# Patient Record
Sex: FEMALE | Race: BLACK OR AFRICAN AMERICAN | NOT HISPANIC OR LATINO
[De-identification: names, ages, dates, MRNs, and addresses within clinical notes are randomized per-mention and may not be internally consistent; named-entity substitution may affect disease eponyms.]

---

## 2017-04-27 ENCOUNTER — APPOINTMENT (OUTPATIENT)
Dept: PSYCHIATRY | Facility: CLINIC | Age: 52
End: 2017-04-27

## 2017-04-27 ENCOUNTER — OUTPATIENT (OUTPATIENT)
Dept: OUTPATIENT SERVICES | Facility: HOSPITAL | Age: 52
LOS: 1 days | Discharge: HOME | End: 2017-04-27

## 2017-04-27 RX ORDER — ALPRAZOLAM 0.25 MG
1 TABLET ORAL
Qty: 20 | Refills: 0 | OUTPATIENT
Start: 2017-04-27

## 2017-06-15 ENCOUNTER — APPOINTMENT (OUTPATIENT)
Dept: PSYCHIATRY | Facility: CLINIC | Age: 52
End: 2017-06-15

## 2017-06-28 ENCOUNTER — APPOINTMENT (OUTPATIENT)
Dept: PSYCHIATRY | Facility: CLINIC | Age: 52
End: 2017-06-28

## 2017-07-07 ENCOUNTER — APPOINTMENT (OUTPATIENT)
Dept: PSYCHIATRY | Facility: CLINIC | Age: 52
End: 2017-07-07

## 2017-07-12 ENCOUNTER — APPOINTMENT (OUTPATIENT)
Dept: PSYCHIATRY | Facility: CLINIC | Age: 52
End: 2017-07-12

## 2017-07-19 ENCOUNTER — APPOINTMENT (OUTPATIENT)
Dept: PSYCHIATRY | Facility: CLINIC | Age: 52
End: 2017-07-19

## 2017-07-26 ENCOUNTER — APPOINTMENT (OUTPATIENT)
Dept: PSYCHIATRY | Facility: CLINIC | Age: 52
End: 2017-07-26

## 2017-08-28 ENCOUNTER — APPOINTMENT (OUTPATIENT)
Dept: PSYCHIATRY | Facility: CLINIC | Age: 52
End: 2017-08-28
Payer: COMMERCIAL

## 2017-08-28 PROCEDURE — 99213 OFFICE O/P EST LOW 20 MIN: CPT | Mod: 25

## 2017-08-28 PROCEDURE — 90836 PSYTX W PT W E/M 45 MIN: CPT | Mod: 59

## 2017-09-06 ENCOUNTER — APPOINTMENT (OUTPATIENT)
Dept: PSYCHIATRY | Facility: CLINIC | Age: 52
End: 2017-09-06
Payer: COMMERCIAL

## 2017-09-06 PROCEDURE — 90836 PSYTX W PT W E/M 45 MIN: CPT | Mod: 59

## 2017-09-06 PROCEDURE — 99213 OFFICE O/P EST LOW 20 MIN: CPT | Mod: 25

## 2017-09-14 ENCOUNTER — APPOINTMENT (OUTPATIENT)
Dept: PSYCHIATRY | Facility: CLINIC | Age: 52
End: 2017-09-14

## 2017-09-18 ENCOUNTER — APPOINTMENT (OUTPATIENT)
Dept: PSYCHIATRY | Facility: CLINIC | Age: 52
End: 2017-09-18
Payer: COMMERCIAL

## 2017-09-18 PROCEDURE — 99213 OFFICE O/P EST LOW 20 MIN: CPT | Mod: 25

## 2017-09-18 PROCEDURE — 90836 PSYTX W PT W E/M 45 MIN: CPT | Mod: 59

## 2017-09-20 ENCOUNTER — APPOINTMENT (OUTPATIENT)
Dept: PSYCHIATRY | Facility: CLINIC | Age: 52
End: 2017-09-20

## 2017-09-28 ENCOUNTER — APPOINTMENT (OUTPATIENT)
Dept: PSYCHIATRY | Facility: CLINIC | Age: 52
End: 2017-09-28
Payer: COMMERCIAL

## 2017-09-28 PROCEDURE — 90836 PSYTX W PT W E/M 45 MIN: CPT | Mod: 59

## 2017-09-28 PROCEDURE — 99212 OFFICE O/P EST SF 10 MIN: CPT | Mod: 25

## 2017-10-01 ENCOUNTER — OUTPATIENT (OUTPATIENT)
Dept: OUTPATIENT SERVICES | Facility: HOSPITAL | Age: 52
LOS: 1 days | Discharge: HOME | End: 2017-10-01

## 2017-10-03 DIAGNOSIS — F43.10 POST-TRAUMATIC STRESS DISORDER, UNSPECIFIED: ICD-10-CM

## 2017-11-03 ENCOUNTER — APPOINTMENT (OUTPATIENT)
Dept: PSYCHIATRY | Facility: CLINIC | Age: 52
End: 2017-11-03
Payer: COMMERCIAL

## 2017-11-03 PROCEDURE — 90836 PSYTX W PT W E/M 45 MIN: CPT | Mod: 59

## 2017-11-03 PROCEDURE — 99213 OFFICE O/P EST LOW 20 MIN: CPT | Mod: 25

## 2017-11-06 DIAGNOSIS — F43.10 POST-TRAUMATIC STRESS DISORDER, UNSPECIFIED: ICD-10-CM

## 2017-12-20 ENCOUNTER — APPOINTMENT (OUTPATIENT)
Dept: PSYCHIATRY | Facility: CLINIC | Age: 52
End: 2017-12-20
Payer: COMMERCIAL

## 2017-12-20 PROCEDURE — 99213 OFFICE O/P EST LOW 20 MIN: CPT | Mod: 25

## 2017-12-20 PROCEDURE — 90836 PSYTX W PT W E/M 45 MIN: CPT | Mod: 59

## 2018-01-03 ENCOUNTER — APPOINTMENT (OUTPATIENT)
Dept: PSYCHIATRY | Facility: CLINIC | Age: 53
End: 2018-01-03
Payer: COMMERCIAL

## 2018-01-03 PROCEDURE — 90836 PSYTX W PT W E/M 45 MIN: CPT | Mod: 59

## 2018-01-03 PROCEDURE — 99213 OFFICE O/P EST LOW 20 MIN: CPT | Mod: 25

## 2018-01-17 ENCOUNTER — APPOINTMENT (OUTPATIENT)
Dept: PSYCHIATRY | Facility: CLINIC | Age: 53
End: 2018-01-17
Payer: COMMERCIAL

## 2018-01-17 PROCEDURE — 99212 OFFICE O/P EST SF 10 MIN: CPT | Mod: 25

## 2018-01-17 PROCEDURE — 90836 PSYTX W PT W E/M 45 MIN: CPT | Mod: 59

## 2018-01-31 ENCOUNTER — APPOINTMENT (OUTPATIENT)
Dept: PSYCHIATRY | Facility: CLINIC | Age: 53
End: 2018-01-31

## 2018-02-14 ENCOUNTER — APPOINTMENT (OUTPATIENT)
Dept: PSYCHIATRY | Facility: CLINIC | Age: 53
End: 2018-02-14

## 2018-02-28 ENCOUNTER — APPOINTMENT (OUTPATIENT)
Dept: PSYCHIATRY | Facility: CLINIC | Age: 53
End: 2018-02-28

## 2018-03-01 ENCOUNTER — APPOINTMENT (OUTPATIENT)
Dept: PSYCHIATRY | Facility: CLINIC | Age: 53
End: 2018-03-01
Payer: COMMERCIAL

## 2018-03-01 PROCEDURE — 90836 PSYTX W PT W E/M 45 MIN: CPT | Mod: 59

## 2018-03-01 PROCEDURE — 99213 OFFICE O/P EST LOW 20 MIN: CPT | Mod: 25

## 2018-03-07 ENCOUNTER — APPOINTMENT (OUTPATIENT)
Dept: PSYCHIATRY | Facility: CLINIC | Age: 53
End: 2018-03-07

## 2018-03-22 ENCOUNTER — APPOINTMENT (OUTPATIENT)
Dept: PSYCHIATRY | Facility: CLINIC | Age: 53
End: 2018-03-22
Payer: COMMERCIAL

## 2018-03-22 PROCEDURE — 90836 PSYTX W PT W E/M 45 MIN: CPT

## 2018-03-22 PROCEDURE — 99213 OFFICE O/P EST LOW 20 MIN: CPT

## 2018-04-05 ENCOUNTER — APPOINTMENT (OUTPATIENT)
Dept: PSYCHIATRY | Facility: CLINIC | Age: 53
End: 2018-04-05

## 2018-04-12 ENCOUNTER — APPOINTMENT (OUTPATIENT)
Dept: PSYCHIATRY | Facility: CLINIC | Age: 53
End: 2018-04-12
Payer: COMMERCIAL

## 2018-04-12 PROCEDURE — 99212 OFFICE O/P EST SF 10 MIN: CPT

## 2018-04-12 PROCEDURE — 90836 PSYTX W PT W E/M 45 MIN: CPT

## 2018-04-19 ENCOUNTER — APPOINTMENT (OUTPATIENT)
Dept: PSYCHIATRY | Facility: CLINIC | Age: 53
End: 2018-04-19

## 2018-05-03 ENCOUNTER — APPOINTMENT (OUTPATIENT)
Dept: PSYCHIATRY | Facility: CLINIC | Age: 53
End: 2018-05-03
Payer: COMMERCIAL

## 2018-05-03 PROCEDURE — 90836 PSYTX W PT W E/M 45 MIN: CPT

## 2018-05-03 PROCEDURE — 99213 OFFICE O/P EST LOW 20 MIN: CPT

## 2018-05-31 ENCOUNTER — APPOINTMENT (OUTPATIENT)
Dept: PSYCHIATRY | Facility: CLINIC | Age: 53
End: 2018-05-31

## 2018-06-21 ENCOUNTER — APPOINTMENT (OUTPATIENT)
Dept: PSYCHIATRY | Facility: CLINIC | Age: 53
End: 2018-06-21
Payer: COMMERCIAL

## 2018-06-21 PROCEDURE — 99212 OFFICE O/P EST SF 10 MIN: CPT

## 2018-06-21 PROCEDURE — 90836 PSYTX W PT W E/M 45 MIN: CPT

## 2018-06-27 ENCOUNTER — APPOINTMENT (OUTPATIENT)
Dept: PSYCHIATRY | Facility: CLINIC | Age: 53
End: 2018-06-27
Payer: COMMERCIAL

## 2018-06-27 PROCEDURE — 99213 OFFICE O/P EST LOW 20 MIN: CPT

## 2018-06-27 PROCEDURE — 90836 PSYTX W PT W E/M 45 MIN: CPT

## 2018-07-06 ENCOUNTER — APPOINTMENT (OUTPATIENT)
Dept: PSYCHIATRY | Facility: CLINIC | Age: 53
End: 2018-07-06

## 2018-07-19 ENCOUNTER — APPOINTMENT (OUTPATIENT)
Dept: PSYCHIATRY | Facility: CLINIC | Age: 53
End: 2018-07-19
Payer: COMMERCIAL

## 2018-07-19 PROCEDURE — 90836 PSYTX W PT W E/M 45 MIN: CPT

## 2018-07-19 PROCEDURE — 99212 OFFICE O/P EST SF 10 MIN: CPT

## 2018-07-26 ENCOUNTER — APPOINTMENT (OUTPATIENT)
Dept: PSYCHIATRY | Facility: CLINIC | Age: 53
End: 2018-07-26
Payer: COMMERCIAL

## 2018-07-26 PROCEDURE — 90836 PSYTX W PT W E/M 45 MIN: CPT

## 2018-07-26 PROCEDURE — 99213 OFFICE O/P EST LOW 20 MIN: CPT

## 2018-08-02 ENCOUNTER — APPOINTMENT (OUTPATIENT)
Dept: PSYCHIATRY | Facility: CLINIC | Age: 53
End: 2018-08-02

## 2018-08-09 ENCOUNTER — APPOINTMENT (OUTPATIENT)
Dept: PSYCHIATRY | Facility: CLINIC | Age: 53
End: 2018-08-09

## 2018-08-17 ENCOUNTER — APPOINTMENT (OUTPATIENT)
Dept: PSYCHIATRY | Facility: CLINIC | Age: 53
End: 2018-08-17

## 2018-08-30 ENCOUNTER — APPOINTMENT (OUTPATIENT)
Dept: PSYCHIATRY | Facility: CLINIC | Age: 53
End: 2018-08-30

## 2018-09-13 ENCOUNTER — APPOINTMENT (OUTPATIENT)
Dept: PSYCHIATRY | Facility: CLINIC | Age: 53
End: 2018-09-13

## 2018-09-20 ENCOUNTER — APPOINTMENT (OUTPATIENT)
Dept: PSYCHIATRY | Facility: CLINIC | Age: 53
End: 2018-09-20
Payer: COMMERCIAL

## 2018-09-20 PROCEDURE — 90836 PSYTX W PT W E/M 45 MIN: CPT

## 2018-09-20 PROCEDURE — 99213 OFFICE O/P EST LOW 20 MIN: CPT

## 2018-09-27 ENCOUNTER — APPOINTMENT (OUTPATIENT)
Dept: PSYCHIATRY | Facility: CLINIC | Age: 53
End: 2018-09-27

## 2018-10-11 ENCOUNTER — APPOINTMENT (OUTPATIENT)
Dept: PSYCHIATRY | Facility: CLINIC | Age: 53
End: 2018-10-11

## 2018-10-12 ENCOUNTER — APPOINTMENT (OUTPATIENT)
Dept: PSYCHIATRY | Facility: CLINIC | Age: 53
End: 2018-10-12

## 2018-10-18 ENCOUNTER — APPOINTMENT (OUTPATIENT)
Dept: PSYCHIATRY | Facility: CLINIC | Age: 53
End: 2018-10-18

## 2018-10-25 ENCOUNTER — APPOINTMENT (OUTPATIENT)
Dept: PSYCHIATRY | Facility: CLINIC | Age: 53
End: 2018-10-25
Payer: COMMERCIAL

## 2018-10-25 ENCOUNTER — APPOINTMENT (OUTPATIENT)
Dept: PSYCHIATRY | Facility: CLINIC | Age: 53
End: 2018-10-25

## 2018-10-25 PROCEDURE — 99213 OFFICE O/P EST LOW 20 MIN: CPT

## 2018-10-25 PROCEDURE — 90836 PSYTX W PT W E/M 45 MIN: CPT

## 2018-11-08 ENCOUNTER — APPOINTMENT (OUTPATIENT)
Dept: PSYCHIATRY | Facility: CLINIC | Age: 53
End: 2018-11-08

## 2018-11-15 ENCOUNTER — APPOINTMENT (OUTPATIENT)
Dept: PSYCHIATRY | Facility: CLINIC | Age: 53
End: 2018-11-15

## 2018-11-29 ENCOUNTER — APPOINTMENT (OUTPATIENT)
Dept: PSYCHIATRY | Facility: CLINIC | Age: 53
End: 2018-11-29

## 2018-12-13 ENCOUNTER — APPOINTMENT (OUTPATIENT)
Dept: PSYCHIATRY | Facility: CLINIC | Age: 53
End: 2018-12-13

## 2019-01-31 ENCOUNTER — APPOINTMENT (OUTPATIENT)
Dept: PSYCHIATRY | Facility: CLINIC | Age: 54
End: 2019-01-31
Payer: COMMERCIAL

## 2019-01-31 PROCEDURE — 90836 PSYTX W PT W E/M 45 MIN: CPT

## 2019-01-31 PROCEDURE — 99213 OFFICE O/P EST LOW 20 MIN: CPT

## 2019-04-12 ENCOUNTER — APPOINTMENT (OUTPATIENT)
Dept: PSYCHIATRY | Facility: CLINIC | Age: 54
End: 2019-04-12

## 2019-05-16 ENCOUNTER — APPOINTMENT (OUTPATIENT)
Dept: PSYCHIATRY | Facility: CLINIC | Age: 54
End: 2019-05-16
Payer: COMMERCIAL

## 2019-05-16 PROCEDURE — 99212 OFFICE O/P EST SF 10 MIN: CPT

## 2019-05-16 PROCEDURE — 90836 PSYTX W PT W E/M 45 MIN: CPT

## 2019-05-30 ENCOUNTER — APPOINTMENT (OUTPATIENT)
Dept: PSYCHIATRY | Facility: CLINIC | Age: 54
End: 2019-05-30

## 2019-06-05 ENCOUNTER — APPOINTMENT (OUTPATIENT)
Dept: PSYCHIATRY | Facility: CLINIC | Age: 54
End: 2019-06-05

## 2019-06-06 ENCOUNTER — APPOINTMENT (OUTPATIENT)
Dept: PSYCHIATRY | Facility: CLINIC | Age: 54
End: 2019-06-06
Payer: COMMERCIAL

## 2019-06-06 PROCEDURE — 99212 OFFICE O/P EST SF 10 MIN: CPT

## 2019-06-06 PROCEDURE — 90836 PSYTX W PT W E/M 45 MIN: CPT

## 2019-06-20 ENCOUNTER — APPOINTMENT (OUTPATIENT)
Dept: PSYCHIATRY | Facility: CLINIC | Age: 54
End: 2019-06-20

## 2019-06-27 ENCOUNTER — APPOINTMENT (OUTPATIENT)
Dept: PSYCHIATRY | Facility: CLINIC | Age: 54
End: 2019-06-27
Payer: COMMERCIAL

## 2019-06-27 PROCEDURE — 99212 OFFICE O/P EST SF 10 MIN: CPT

## 2019-06-27 PROCEDURE — 90836 PSYTX W PT W E/M 45 MIN: CPT

## 2019-07-11 ENCOUNTER — APPOINTMENT (OUTPATIENT)
Dept: PSYCHIATRY | Facility: CLINIC | Age: 54
End: 2019-07-11

## 2019-07-18 ENCOUNTER — APPOINTMENT (OUTPATIENT)
Dept: PSYCHIATRY | Facility: CLINIC | Age: 54
End: 2019-07-18
Payer: COMMERCIAL

## 2019-07-18 PROCEDURE — 90836 PSYTX W PT W E/M 45 MIN: CPT

## 2019-07-18 PROCEDURE — 99212 OFFICE O/P EST SF 10 MIN: CPT

## 2019-07-25 ENCOUNTER — APPOINTMENT (OUTPATIENT)
Dept: PSYCHIATRY | Facility: CLINIC | Age: 54
End: 2019-07-25

## 2019-08-01 ENCOUNTER — APPOINTMENT (OUTPATIENT)
Dept: PSYCHIATRY | Facility: CLINIC | Age: 54
End: 2019-08-01
Payer: COMMERCIAL

## 2019-08-01 ENCOUNTER — APPOINTMENT (OUTPATIENT)
Dept: PSYCHIATRY | Facility: CLINIC | Age: 54
End: 2019-08-01

## 2019-08-01 PROCEDURE — 99212 OFFICE O/P EST SF 10 MIN: CPT

## 2019-08-01 PROCEDURE — 90836 PSYTX W PT W E/M 45 MIN: CPT

## 2019-08-08 ENCOUNTER — APPOINTMENT (OUTPATIENT)
Dept: PSYCHIATRY | Facility: CLINIC | Age: 54
End: 2019-08-08

## 2019-08-15 ENCOUNTER — APPOINTMENT (OUTPATIENT)
Dept: PSYCHIATRY | Facility: CLINIC | Age: 54
End: 2019-08-15

## 2019-08-15 ENCOUNTER — APPOINTMENT (OUTPATIENT)
Dept: PSYCHIATRY | Facility: CLINIC | Age: 54
End: 2019-08-15
Payer: COMMERCIAL

## 2019-08-15 PROCEDURE — 99213 OFFICE O/P EST LOW 20 MIN: CPT

## 2019-08-15 PROCEDURE — 90836 PSYTX W PT W E/M 45 MIN: CPT

## 2019-08-22 ENCOUNTER — APPOINTMENT (OUTPATIENT)
Dept: PSYCHIATRY | Facility: CLINIC | Age: 54
End: 2019-08-22

## 2019-08-29 ENCOUNTER — APPOINTMENT (OUTPATIENT)
Dept: PSYCHIATRY | Facility: CLINIC | Age: 54
End: 2019-08-29

## 2019-09-05 ENCOUNTER — APPOINTMENT (OUTPATIENT)
Dept: PSYCHIATRY | Facility: CLINIC | Age: 54
End: 2019-09-05

## 2019-09-12 ENCOUNTER — APPOINTMENT (OUTPATIENT)
Dept: PSYCHIATRY | Facility: CLINIC | Age: 54
End: 2019-09-12

## 2019-09-12 ENCOUNTER — APPOINTMENT (OUTPATIENT)
Dept: PSYCHIATRY | Facility: CLINIC | Age: 54
End: 2019-09-12
Payer: COMMERCIAL

## 2019-09-12 PROCEDURE — 99212 OFFICE O/P EST SF 10 MIN: CPT

## 2019-09-12 PROCEDURE — 90836 PSYTX W PT W E/M 45 MIN: CPT

## 2019-09-19 ENCOUNTER — APPOINTMENT (OUTPATIENT)
Dept: PSYCHIATRY | Facility: CLINIC | Age: 54
End: 2019-09-19

## 2019-09-26 ENCOUNTER — APPOINTMENT (OUTPATIENT)
Dept: PSYCHIATRY | Facility: CLINIC | Age: 54
End: 2019-09-26

## 2019-10-10 ENCOUNTER — APPOINTMENT (OUTPATIENT)
Dept: PSYCHIATRY | Facility: CLINIC | Age: 54
End: 2019-10-10

## 2019-10-24 ENCOUNTER — APPOINTMENT (OUTPATIENT)
Dept: PSYCHIATRY | Facility: CLINIC | Age: 54
End: 2019-10-24

## 2019-11-07 ENCOUNTER — APPOINTMENT (OUTPATIENT)
Dept: PSYCHIATRY | Facility: CLINIC | Age: 54
End: 2019-11-07

## 2019-11-21 ENCOUNTER — APPOINTMENT (OUTPATIENT)
Dept: PSYCHIATRY | Facility: CLINIC | Age: 54
End: 2019-11-21

## 2019-12-05 ENCOUNTER — APPOINTMENT (OUTPATIENT)
Dept: PSYCHIATRY | Facility: CLINIC | Age: 54
End: 2019-12-05

## 2024-03-07 ENCOUNTER — APPOINTMENT (OUTPATIENT)
Dept: NEUROLOGY | Facility: CLINIC | Age: 59
End: 2024-03-07

## 2024-10-28 RX ORDER — CHLORHEXIDINE GLUCONATE 40 MG/ML
1 SOLUTION TOPICAL EVERY 12 HOURS
Refills: 0 | Status: COMPLETED | OUTPATIENT
Start: 2024-10-31 | End: 2024-11-01

## 2024-10-28 RX ORDER — POVIDONE-IODINE 0.07 MG/ML
1 SOLUTION TOPICAL ONCE
Refills: 0 | Status: COMPLETED | OUTPATIENT
Start: 2024-10-31 | End: 2024-10-31

## 2024-10-28 NOTE — H&P ADULT - NSICDXPASTMEDICALHX_GEN_ALL_CORE_FT
PAST MEDICAL HISTORY:  Cervical spinal stenosis      PAST MEDICAL HISTORY:  2019 novel coronavirus disease (COVID-19) SOB    Cervical spinal stenosis     HLD (hyperlipidemia)     HTN (hypertension)

## 2024-10-28 NOTE — H&P ADULT - NSHPPHYSICALEXAM_GEN_ALL_CORE
MSK: + decreased ROM 2/2 pain, cervical spine       Remainder of exam per medical clearance note MSK: + decreased ROM 2/2 pain, cervical spine     Remainder of exam per medical clearance note MSK: Skin warm and well perfused. Sensation intact and equal to bilateral upper extremities.Radial pulses palpable bilateral upper extremities. /biceps/triceps 4/5 bilateral upper extremities. + decreased ROM 2/2 pain, cervical spine     Remainder of exam per medical clearance note

## 2024-10-28 NOTE — H&P ADULT - NSHPLABSRESULTS_GEN_ALL_CORE
3M DOS Preop CBC, BMP, PT/PTT/INR, UA - WNL per medical clearance   H/H 13.8/42.7  Cr 1.06  Preop EKG - sinus rhythm - WNL per medical clearance    DOS Preop CBC, BMP, PT/PTT/INR, UA - WNL per medical clearance   H/H 13.8/42.7  Cr 1.06  Preop EKG - sinus rhythm - WNL per medical clearance   Povidone iodine nasal swab to be given day of surgery

## 2024-10-28 NOTE — H&P ADULT - PROBLEM SELECTOR PLAN 1
Admit to Orthopaedic Service.  Presents today for elective ACDF C3-C4 with ICBG  Pt medically stable and cleared for procedure today by

## 2024-10-28 NOTE — H&P ADULT - HISTORY OF PRESENT ILLNESS
59F c/o neck pain x       Present for elective Anterior Cervical Discectomy with Fusion C3-C4 with iliac crest bone graft  59F c/o neck pain chronic without preceding accident/injury/trauma. States pain radiates to both of her arms, has tingling in her bilateral hands. Ambulates with assistance of a cane. Takes tylenol for her pain and sometimes oxycodone/diclofenac. Denies history of blood clots.     Present for elective Anterior Cervical Discectomy with Fusion C3-C4 with iliac crest bone graft

## 2024-10-30 VITALS
SYSTOLIC BLOOD PRESSURE: 129 MMHG | OXYGEN SATURATION: 100 % | RESPIRATION RATE: 16 BRPM | HEART RATE: 64 BPM | DIASTOLIC BLOOD PRESSURE: 77 MMHG | TEMPERATURE: 98 F | HEIGHT: 66 IN | WEIGHT: 174.17 LBS

## 2024-10-30 NOTE — ASU PATIENT PROFILE, ADULT - FALL HARM RISK - HARM RISK INTERVENTIONS

## 2024-10-31 ENCOUNTER — INPATIENT (INPATIENT)
Facility: HOSPITAL | Age: 59
LOS: 0 days | Discharge: HOME | DRG: 472 | End: 2024-11-01
Attending: SPECIALIST | Admitting: SPECIALIST
Payer: COMMERCIAL

## 2024-10-31 DIAGNOSIS — M48.02 SPINAL STENOSIS, CERVICAL REGION: ICD-10-CM

## 2024-10-31 DIAGNOSIS — Z90.710 ACQUIRED ABSENCE OF BOTH CERVIX AND UTERUS: Chronic | ICD-10-CM

## 2024-10-31 LAB
BLD GP AB SCN SERPL QL: NEGATIVE — SIGNIFICANT CHANGE UP
RH IG SCN BLD-IMP: POSITIVE — SIGNIFICANT CHANGE UP

## 2024-10-31 DEVICE — CLIP APPLIER ETHICON LIGACLIP 13" LARGE: Type: IMPLANTABLE DEVICE | Status: FUNCTIONAL

## 2024-10-31 DEVICE — IMPLANTABLE DEVICE: Type: IMPLANTABLE DEVICE | Status: FUNCTIONAL

## 2024-10-31 DEVICE — SURGIFLO HEMOSTATIC MATRIX KIT: Type: IMPLANTABLE DEVICE | Status: FUNCTIONAL

## 2024-10-31 DEVICE — DISTRACTION PIN 14MM (YELLOW): Type: IMPLANTABLE DEVICE | Status: FUNCTIONAL

## 2024-10-31 DEVICE — CLIP APPLIER COVIDIEN SURGICLIP III 9" SM: Type: IMPLANTABLE DEVICE | Status: FUNCTIONAL

## 2024-10-31 RX ORDER — OXYCODONE HYDROCHLORIDE 30 MG/1
5 TABLET ORAL EVERY 4 HOURS
Refills: 0 | Status: DISCONTINUED | OUTPATIENT
Start: 2024-10-31 | End: 2024-11-01

## 2024-10-31 RX ORDER — HYDROMORPHONE HCL/0.9% NACL/PF 6 MG/30 ML
0.5 PATIENT CONTROLLED ANALGESIA SYRINGE INTRAVENOUS ONCE
Refills: 0 | Status: DISCONTINUED | OUTPATIENT
Start: 2024-10-31 | End: 2024-10-31

## 2024-10-31 RX ORDER — LOSARTAN POTASSIUM 25 MG/1
1 TABLET ORAL
Refills: 0 | DISCHARGE

## 2024-10-31 RX ORDER — LOSARTAN POTASSIUM 25 MG/1
25 TABLET ORAL DAILY
Refills: 0 | Status: DISCONTINUED | OUTPATIENT
Start: 2024-11-01 | End: 2024-11-01

## 2024-10-31 RX ORDER — ACETAMINOPHEN 500 MG
1000 TABLET ORAL ONCE
Refills: 0 | Status: COMPLETED | OUTPATIENT
Start: 2024-10-31 | End: 2024-10-31

## 2024-10-31 RX ORDER — OXYCODONE HYDROCHLORIDE 30 MG/1
2.5 TABLET ORAL EVERY 6 HOURS
Refills: 0 | Status: DISCONTINUED | OUTPATIENT
Start: 2024-10-31 | End: 2024-11-01

## 2024-10-31 RX ORDER — ONDANSETRON HYDROCHLORIDE 2 MG/ML
4 INJECTION, SOLUTION INTRAMUSCULAR; INTRAVENOUS EVERY 6 HOURS
Refills: 0 | Status: DISCONTINUED | OUTPATIENT
Start: 2024-10-31 | End: 2024-11-01

## 2024-10-31 RX ORDER — ACETAMINOPHEN 500 MG
1000 TABLET ORAL EVERY 8 HOURS
Refills: 0 | Status: DISCONTINUED | OUTPATIENT
Start: 2024-11-01 | End: 2024-11-01

## 2024-10-31 RX ORDER — CEFAZOLIN SODIUM 1 G
2000 VIAL (EA) INJECTION EVERY 8 HOURS
Refills: 0 | Status: COMPLETED | OUTPATIENT
Start: 2024-10-31 | End: 2024-11-01

## 2024-10-31 RX ORDER — METHOCARBAMOL 500 MG/1
500 TABLET ORAL EVERY 8 HOURS
Refills: 0 | Status: DISCONTINUED | OUTPATIENT
Start: 2024-10-31 | End: 2024-11-01

## 2024-10-31 RX ORDER — PREGABALIN 150 MG/1
50 CAPSULE ORAL THREE TIMES A DAY
Refills: 0 | Status: DISCONTINUED | OUTPATIENT
Start: 2024-10-31 | End: 2024-11-01

## 2024-10-31 RX ORDER — ACETAMINOPHEN 500 MG
650 TABLET ORAL EVERY 6 HOURS
Refills: 0 | Status: DISCONTINUED | OUTPATIENT
Start: 2024-10-31 | End: 2024-10-31

## 2024-10-31 RX ORDER — ACETAMINOPHEN 500 MG
1000 TABLET ORAL EVERY 8 HOURS
Refills: 0 | Status: DISCONTINUED | OUTPATIENT
Start: 2024-10-31 | End: 2024-10-31

## 2024-10-31 RX ORDER — HYDROMORPHONE HCL/0.9% NACL/PF 6 MG/30 ML
0.5 PATIENT CONTROLLED ANALGESIA SYRINGE INTRAVENOUS EVERY 6 HOURS
Refills: 0 | Status: DISCONTINUED | OUTPATIENT
Start: 2024-10-31 | End: 2024-11-01

## 2024-10-31 RX ORDER — HYDROMORPHONE HCL/0.9% NACL/PF 6 MG/30 ML
0.5 PATIENT CONTROLLED ANALGESIA SYRINGE INTRAVENOUS EVERY 4 HOURS
Refills: 0 | Status: DISCONTINUED | OUTPATIENT
Start: 2024-10-31 | End: 2024-11-01

## 2024-10-31 RX ORDER — APREPITANT 40 MG/1
40 CAPSULE ORAL ONCE
Refills: 0 | Status: COMPLETED | OUTPATIENT
Start: 2024-10-31 | End: 2024-10-31

## 2024-10-31 RX ADMIN — METHOCARBAMOL 500 MILLIGRAM(S): 500 TABLET ORAL at 21:18

## 2024-10-31 RX ADMIN — Medication 1000 MILLIGRAM(S): at 09:47

## 2024-10-31 RX ADMIN — Medication 0.5 MILLIGRAM(S): at 16:55

## 2024-10-31 RX ADMIN — ONDANSETRON HYDROCHLORIDE 4 MILLIGRAM(S): 2 INJECTION, SOLUTION INTRAMUSCULAR; INTRAVENOUS at 17:29

## 2024-10-31 RX ADMIN — Medication 0.5 MILLIGRAM(S): at 16:18

## 2024-10-31 RX ADMIN — Medication 1000 MILLIGRAM(S): at 23:46

## 2024-10-31 RX ADMIN — POVIDONE-IODINE 1 APPLICATION(S): 0.07 SOLUTION TOPICAL at 09:47

## 2024-10-31 RX ADMIN — Medication 0.5 MILLIGRAM(S): at 17:08

## 2024-10-31 RX ADMIN — Medication 400 MILLIGRAM(S): at 17:00

## 2024-10-31 RX ADMIN — OXYCODONE HYDROCHLORIDE 5 MILLIGRAM(S): 30 TABLET ORAL at 20:35

## 2024-10-31 RX ADMIN — PREGABALIN 50 MILLIGRAM(S): 150 CAPSULE ORAL at 21:18

## 2024-10-31 RX ADMIN — CHLORHEXIDINE GLUCONATE 1 APPLICATION(S): 40 SOLUTION TOPICAL at 18:47

## 2024-10-31 RX ADMIN — APREPITANT 40 MILLIGRAM(S): 40 CAPSULE ORAL at 09:47

## 2024-10-31 RX ADMIN — Medication 1000 MILLIGRAM(S): at 17:08

## 2024-10-31 RX ADMIN — OXYCODONE HYDROCHLORIDE 5 MILLIGRAM(S): 30 TABLET ORAL at 21:35

## 2024-10-31 RX ADMIN — Medication 20 MILLIGRAM(S): at 21:18

## 2024-10-31 RX ADMIN — Medication 100 MILLIGRAM(S): at 17:29

## 2024-10-31 NOTE — OCCUPATIONAL THERAPY INITIAL EVALUATION ADULT - MODALITIES TREATMENT COMMENTS
Pt limited by lethargy throughout session. Pt able to perform bed mobility, requiring Min Ax1 2/2 impaired postural control and decreased BUE/BLE strength, impacting ability to perform functional reach/weigh-shifting. Pt performed UB/LB dressing while sitting at EOB, requiring CGAx1 2/2 impaired dynamic balance and decreased functional reach. Pt performed sit<->stand with Mod Ax1 ( hand held assist ), and demo ability to ambulate 5 R side steps with Min Ax1, demo impaired balance with decreased step length and impaired ability to weight shift, due to impaired BLE strength. Pt unable to tolerate any further activity, returned to bed and left as found, +all lines, +call bell, +bed alarm, NAD, VSS, RN Esperanza made aware.

## 2024-10-31 NOTE — OCCUPATIONAL THERAPY INITIAL EVALUATION ADULT - FINE MOTOR COORDINATION, RIGHT HAND THUMB/FINGER OPPOSITION SKILLS, OT EVAL
normal performance Affect and characteristics of appearance, verbalizations, behaviors are appropriate negative

## 2024-10-31 NOTE — OCCUPATIONAL THERAPY INITIAL EVALUATION ADULT - ADDITIONAL COMMENTS
Prior to admission, pt able to perform all functional mobility/ADLs independently. Pt reports using SC for ambulation as needed 2/2 unsteadiness.

## 2024-10-31 NOTE — OCCUPATIONAL THERAPY INITIAL EVALUATION ADULT - GENERAL OBSERVATIONS, REHAB EVAL
OT IE completed. Orders received, chart reviewed, pt cleared for OT by JENNIFER Mata. Pt received semi supine in bed, NAD, +c-collar, +hemovac, +tele. Pt A&Ox4, agreeable to OT, and tolerated session fairly.

## 2024-10-31 NOTE — BRIEF OPERATIVE NOTE - NSICDXBRIEFPROCEDURE_GEN_ALL_CORE_FT
PROCEDURES:  Anterior cervical discectomy and fusion (ACDF) at 2 levels 31-Oct-2024 15:34:42  Ru Ryan

## 2024-10-31 NOTE — OCCUPATIONAL THERAPY INITIAL EVALUATION ADULT - PERTINENT HX OF CURRENT PROBLEM, REHAB EVAL
59F c/o neck pain chronic without preceding accident/injury/trauma. States pain radiates to both of her arms, has tingling in her bilateral hands. Ambulates with assistance of a cane. Takes tylenol for her pain and sometimes oxycodone/diclofenac. Denies history of blood clots. Pt now POD#0 s/p ACDF C3-C4 with ICBG.

## 2024-10-31 NOTE — OCCUPATIONAL THERAPY INITIAL EVALUATION ADULT - LIVES WITH, PROFILE
in apartment with ramp access to enter building, and elevator access inside building./significant other

## 2024-10-31 NOTE — OCCUPATIONAL THERAPY INITIAL EVALUATION ADULT - LUE MMT, REHAB EVAL
shoulder and elbow grossly 3+/5 or greater based on functional mobility against gravity (conservative resistance given due to spinal precautions)  5/5

## 2024-11-01 ENCOUNTER — TRANSCRIPTION ENCOUNTER (OUTPATIENT)
Age: 59
End: 2024-11-01

## 2024-11-01 VITALS — SYSTOLIC BLOOD PRESSURE: 120 MMHG | DIASTOLIC BLOOD PRESSURE: 58 MMHG

## 2024-11-01 LAB
ANION GAP SERPL CALC-SCNC: 10 MMOL/L — SIGNIFICANT CHANGE UP (ref 5–17)
BASOPHILS # BLD AUTO: 0.02 K/UL — SIGNIFICANT CHANGE UP (ref 0–0.2)
BASOPHILS NFR BLD AUTO: 0.2 % — SIGNIFICANT CHANGE UP (ref 0–2)
BUN SERPL-MCNC: 10 MG/DL — SIGNIFICANT CHANGE UP (ref 7–23)
CALCIUM SERPL-MCNC: 9 MG/DL — SIGNIFICANT CHANGE UP (ref 8.4–10.5)
CHLORIDE SERPL-SCNC: 104 MMOL/L — SIGNIFICANT CHANGE UP (ref 96–108)
CO2 SERPL-SCNC: 25 MMOL/L — SIGNIFICANT CHANGE UP (ref 22–31)
CREAT SERPL-MCNC: 0.75 MG/DL — SIGNIFICANT CHANGE UP (ref 0.5–1.3)
EGFR: 91 ML/MIN/1.73M2 — SIGNIFICANT CHANGE UP
EOSINOPHIL # BLD AUTO: 0 K/UL — SIGNIFICANT CHANGE UP (ref 0–0.5)
EOSINOPHIL NFR BLD AUTO: 0 % — SIGNIFICANT CHANGE UP (ref 0–6)
GLUCOSE SERPL-MCNC: 107 MG/DL — HIGH (ref 70–99)
HCT VFR BLD CALC: 41.6 % — SIGNIFICANT CHANGE UP (ref 34.5–45)
HGB BLD-MCNC: 13.4 G/DL — SIGNIFICANT CHANGE UP (ref 11.5–15.5)
IMM GRANULOCYTES NFR BLD AUTO: 0.4 % — SIGNIFICANT CHANGE UP (ref 0–0.9)
LYMPHOCYTES # BLD AUTO: 1.76 K/UL — SIGNIFICANT CHANGE UP (ref 1–3.3)
LYMPHOCYTES # BLD AUTO: 13.3 % — SIGNIFICANT CHANGE UP (ref 13–44)
MCHC RBC-ENTMCNC: 27.3 PG — SIGNIFICANT CHANGE UP (ref 27–34)
MCHC RBC-ENTMCNC: 32.2 G/DL — SIGNIFICANT CHANGE UP (ref 32–36)
MCV RBC AUTO: 84.9 FL — SIGNIFICANT CHANGE UP (ref 80–100)
MONOCYTES # BLD AUTO: 1.2 K/UL — HIGH (ref 0–0.9)
MONOCYTES NFR BLD AUTO: 9 % — SIGNIFICANT CHANGE UP (ref 2–14)
NEUTROPHILS # BLD AUTO: 10.25 K/UL — HIGH (ref 1.8–7.4)
NEUTROPHILS NFR BLD AUTO: 77.1 % — HIGH (ref 43–77)
NRBC # BLD: 0 /100 WBCS — SIGNIFICANT CHANGE UP (ref 0–0)
PLATELET # BLD AUTO: 192 K/UL — SIGNIFICANT CHANGE UP (ref 150–400)
POTASSIUM SERPL-MCNC: 4 MMOL/L — SIGNIFICANT CHANGE UP (ref 3.5–5.3)
POTASSIUM SERPL-SCNC: 4 MMOL/L — SIGNIFICANT CHANGE UP (ref 3.5–5.3)
RBC # BLD: 4.9 M/UL — SIGNIFICANT CHANGE UP (ref 3.8–5.2)
RBC # FLD: 12.9 % — SIGNIFICANT CHANGE UP (ref 10.3–14.5)
SODIUM SERPL-SCNC: 139 MMOL/L — SIGNIFICANT CHANGE UP (ref 135–145)
WBC # BLD: 13.28 K/UL — HIGH (ref 3.8–10.5)
WBC # FLD AUTO: 13.28 K/UL — HIGH (ref 3.8–10.5)

## 2024-11-01 PROCEDURE — 97110 THERAPEUTIC EXERCISES: CPT

## 2024-11-01 PROCEDURE — 99255 IP/OBS CONSLTJ NEW/EST HI 80: CPT

## 2024-11-01 PROCEDURE — 36415 COLL VENOUS BLD VENIPUNCTURE: CPT

## 2024-11-01 PROCEDURE — 97535 SELF CARE MNGMENT TRAINING: CPT

## 2024-11-01 PROCEDURE — 97165 OT EVAL LOW COMPLEX 30 MIN: CPT

## 2024-11-01 PROCEDURE — 72040 X-RAY EXAM NECK SPINE 2-3 VW: CPT | Mod: 26

## 2024-11-01 PROCEDURE — 76000 FLUOROSCOPY <1 HR PHYS/QHP: CPT

## 2024-11-01 PROCEDURE — 86850 RBC ANTIBODY SCREEN: CPT

## 2024-11-01 PROCEDURE — C1713: CPT

## 2024-11-01 PROCEDURE — 86901 BLOOD TYPING SEROLOGIC RH(D): CPT

## 2024-11-01 PROCEDURE — 80048 BASIC METABOLIC PNL TOTAL CA: CPT

## 2024-11-01 PROCEDURE — C1889: CPT

## 2024-11-01 PROCEDURE — 72040 X-RAY EXAM NECK SPINE 2-3 VW: CPT

## 2024-11-01 PROCEDURE — 85025 COMPLETE CBC W/AUTO DIFF WBC: CPT

## 2024-11-01 PROCEDURE — 86900 BLOOD TYPING SEROLOGIC ABO: CPT

## 2024-11-01 RX ORDER — DICLOFENAC SODIUM 75 MG
1 TABLET, DELAYED RELEASE (ENTERIC COATED) ORAL
Refills: 0 | DISCHARGE

## 2024-11-01 RX ORDER — PREGABALIN 150 MG/1
1 CAPSULE ORAL
Qty: 21 | Refills: 0
Start: 2024-11-01 | End: 2024-11-07

## 2024-11-01 RX ORDER — ACETAMINOPHEN 500 MG
2 TABLET ORAL
Refills: 0 | DISCHARGE

## 2024-11-01 RX ORDER — TRAMADOL HYDROCHLORIDE 50 MG/1
1 TABLET, COATED ORAL
Refills: 0 | DISCHARGE

## 2024-11-01 RX ORDER — OXYCODONE AND ACETAMINOPHEN 7.5; 325 MG/1; MG/1
1 TABLET ORAL
Refills: 0 | DISCHARGE

## 2024-11-01 RX ORDER — ACETAMINOPHEN 500 MG
2 TABLET ORAL
Qty: 84 | Refills: 0
Start: 2024-11-01 | End: 2024-11-14

## 2024-11-01 RX ORDER — OXYCODONE HYDROCHLORIDE 30 MG/1
1 TABLET ORAL
Qty: 42 | Refills: 0
Start: 2024-11-01 | End: 2024-11-07

## 2024-11-01 RX ORDER — METHOCARBAMOL 500 MG/1
1 TABLET ORAL
Qty: 21 | Refills: 0
Start: 2024-11-01 | End: 2024-11-07

## 2024-11-01 RX ADMIN — Medication 100 MILLIGRAM(S): at 01:28

## 2024-11-01 RX ADMIN — Medication 0.5 MILLIGRAM(S): at 08:43

## 2024-11-01 RX ADMIN — Medication 1000 MILLIGRAM(S): at 07:00

## 2024-11-01 RX ADMIN — Medication 0.5 MILLIGRAM(S): at 08:27

## 2024-11-01 RX ADMIN — METHOCARBAMOL 500 MILLIGRAM(S): 500 TABLET ORAL at 14:10

## 2024-11-01 RX ADMIN — OXYCODONE HYDROCHLORIDE 5 MILLIGRAM(S): 30 TABLET ORAL at 13:17

## 2024-11-01 RX ADMIN — PREGABALIN 50 MILLIGRAM(S): 150 CAPSULE ORAL at 14:10

## 2024-11-01 RX ADMIN — OXYCODONE HYDROCHLORIDE 5 MILLIGRAM(S): 30 TABLET ORAL at 01:28

## 2024-11-01 RX ADMIN — OXYCODONE HYDROCHLORIDE 5 MILLIGRAM(S): 30 TABLET ORAL at 05:09

## 2024-11-01 RX ADMIN — METHOCARBAMOL 500 MILLIGRAM(S): 500 TABLET ORAL at 05:09

## 2024-11-01 RX ADMIN — PREGABALIN 50 MILLIGRAM(S): 150 CAPSULE ORAL at 05:09

## 2024-11-01 RX ADMIN — OXYCODONE HYDROCHLORIDE 5 MILLIGRAM(S): 30 TABLET ORAL at 00:30

## 2024-11-01 RX ADMIN — OXYCODONE HYDROCHLORIDE 5 MILLIGRAM(S): 30 TABLET ORAL at 06:14

## 2024-11-01 NOTE — DISCHARGE NOTE PROVIDER - NSDCFUADDINST_GEN_ALL_CORE_FT
ACTIVITY:  - Weightbear as tolerated bilateral upper extremities with collar. No strenuous activity, heavy lifting, bending, twisting, driving or returning to work until cleared by MD.      DRESSING: Prineo  You may take showers. Your dressing is water resistant. Let soapy water fall over incision and pat dry.   No soaking in bathtubs. Leave incision open to air. Keep incision clean and dry. Do not remove the dressing/tape overlying your incision.     MEDICATION/ANTICOAGULATION:  - You have been prescribed medications for pain:    - Tylenol for mild to moderate pain. Do not exceed 3,000mg daily.    - For more severe pain, take Tylenol with the addition of narcotic pain medication. Take this medication as prescribed. This medication may cause drowsiness or dizziness. Do not operate machinery. This medication may cause constipation.  - For any additional medications, follow instructions on the bottle.   -Try to have regular bowel movements. Take stool softener or laxative if necessary. You may wish to take Miralax daily until you have regular bowel movements.   - If you have a pain management physician, please follow-up with them postoperatively.   - If you experience any negative side effects of your medications, please call your surgeon's office to discuss.    Follow-up:  - Call to schedule an appt with Dr. García  for follow up. If you have staples or sutures they will be removed in office.  - Please follow-up with your primary care physician or any other specialist you see postoperatively, if needed.   - Contact your doctor if you experience: fever greater than 101.5, chills, chest pain, difficulty breathing, redness or excessive drainage around the incision, other concerns.

## 2024-11-01 NOTE — DISCHARGE NOTE NURSING/CASE MANAGEMENT/SOCIAL WORK - PATIENT PORTAL LINK FT
You can access the FollowMyHealth Patient Portal offered by [facility] by registering at the following website: [email or web address]. By joining [employer]’s FollowMyHealth portal, you will also be able to view your health information using other applications (apps) compatible with our system.

## 2024-11-01 NOTE — DISCHARGE NOTE NURSING/CASE MANAGEMENT/SOCIAL WORK - FINANCIAL ASSISTANCE
[facility] provides services at a reduced cost to those who are determined to be eligible through [facility]’s financial assistance program. Information regarding [facility]’s financial assistance program can be found by going to [email or web address] or by calling [phone].

## 2024-11-01 NOTE — DISCHARGE NOTE PROVIDER - HOSPITAL COURSE
Admitted  Surgery ACDF C3-C5  Cori-op Antibiotics  Pain control  DVT prophylaxis  OOB/Occupational Therapy

## 2024-11-01 NOTE — DISCHARGE NOTE PROVIDER - NSDCCPTREATMENT_GEN_ALL_CORE_FT
PRINCIPAL PROCEDURE  Procedure: Anterior cervical discectomy and fusion (ACDF) at 2 levels  Findings and Treatment: C3-C5

## 2024-11-01 NOTE — DISCHARGE NOTE PROVIDER - CARE PROVIDERS DIRECT ADDRESSES
[email or web address]

## 2024-11-01 NOTE — DISCHARGE NOTE PROVIDER - NSDCMRMEDTOKEN_GEN_ALL_CORE_FT
acetaminophen 500 mg oral tablet: 2 tab(s) orally every 8 hours MDD: 6  atorvastatin 20 mg oral tablet: 1 tab(s) orally once a day  losartan 25 mg oral tablet: 1 tab(s) orally once a day  methocarbamol 500 mg oral tablet: 1 tab(s) orally every 8 hours MDD: 3  oxyCODONE 5 mg oral tablet: 1 tab(s) orally every 4 to 6 hours as needed for Severe Pain (7 - 10) Take 1-2 tabs every 4-6h as needed for moderate to severe pain MDD: 6  pregabalin 50 mg oral capsule: 1 cap(s) orally 3 times a day MDD: 3

## 2024-11-01 NOTE — DISCHARGE NOTE NURSING/CASE MANAGEMENT/SOCIAL WORK - NSDCPEFALRISK_GEN_ALL_CORE
For information on Fall & Injury Prevention, visit: [email or web address] OR  [email or web address] OR  https://www.cdc.gov/steadi/patient.html

## 2024-11-01 NOTE — CONSULT NOTE ADULT - SUBJECTIVE AND OBJECTIVE BOX
Pt seen and evaluated at bedside.  in summary the pt is a 59F  w/ pmhx of HTN/HLD, cervical spinal stenosis p/w  neck pain chronic without preceding accident/injury/trauma.  The pt stated that the  pain radiates to both of her arms, has tingling in her bilateral hands. The pt is now s/p elective Anterior Cervical Discectomy with Fusion C3-C4 with iliac crest bone graft.     PAST MEDICAL & SURGICAL HISTORY:  Cervical spinal stenosis  HTN (hypertension)  HLD (hyperlipidemia)  2019 novel coronavirus disease (COVID-19)  H/O: hysterectomy      Home Medications:  atorvastatin 20 mg oral tablet: 1 tab(s) orally once a day (31 Oct 2024 09:45)  losartan 25 mg oral tablet: 1 tab(s) orally once a day (31 Oct 2024 09:45)    Allergies: No Known Allergies    Social History:  social etoh drinker, quit cigarette smoking 8 years ago (28 Oct 2024 12:02)    VITAL SIGNS, INS/OUTS (last 24 hours):  Vital Signs Last 24 Hrs  T(C): 36.7 (01 Nov 2024 08:15), Max: 36.9 (01 Nov 2024 05:05)  T(F): 98.1 (01 Nov 2024 08:15), Max: 98.5 (01 Nov 2024 05:05)  HR: 67 (01 Nov 2024 08:15) (54 - 85)  BP: 136/73 (01 Nov 2024 08:15) (107/68 - 138/80)  BP(mean): 75 (31 Oct 2024 17:20) (75 - 91)  RR: 16 (01 Nov 2024 08:15) (12 - 24)  SpO2: 97% (01 Nov 2024 08:15) (94% - 99%)    Parameters below as of 01 Nov 2024 08:15  Patient On (Oxygen Delivery Method): room air      PHYSICAL EXAM:  NAD laying in bed  HEENT: cervical collar in place   CTA b/l no wheezing or crackles  NL S1,S2 no mumurs   soft NT/ND + BS no rebound or guarding   AAox3; sensation intact; strength 5/5 b/l UE and LE; follows commands     BASIC LABS:                        13.4   13.28 )-----------( 192      ( 01 Nov 2024 08:29 )             41.6     139  |  104  |  10  ----------------------------<  107[H]  4.0   |  25  |  0.75    Ca    9.0      01 Nov 2024 08:29      CURRENT MEDICATIONS:   acetaminophen     Tablet .. 1000 milliGRAM(s) Oral every 8 hours  atorvastatin 20 milliGRAM(s) Oral at bedtime  HYDROmorphone  Injectable 0.5 milliGRAM(s) IV Push every 4 hours PRN  HYDROmorphone  Injectable 0.5 milliGRAM(s) IV Push every 6 hours PRN  losartan 25 milliGRAM(s) Oral daily  methocarbamol 500 milliGRAM(s) Oral every 8 hours  ondansetron   Disintegrating Tablet 4 milliGRAM(s) Oral every 6 hours  oxyCODONE    IR 5 milliGRAM(s) Oral every 4 hours PRN  oxyCODONE    IR 2.5 milliGRAM(s) Oral every 6 hours PRN  pregabalin 50 milliGRAM(s) Oral three times a day           Pt seen and evaluated at bedside.  in summary the pt is a 59F  w/ pmhx of HTN/HLD, cervical spinal stenosis p/w  neck pain chronic without preceding accident/injury/trauma.  The pt stated that the  pain radiates to both of her arms, has tingling in her bilateral hands. The pt is now s/p elective Anterior Cervical Discectomy with Fusion C3-C4 with iliac crest bone graft.     PAST MEDICAL & SURGICAL HISTORY:  Cervical spinal stenosis  HTN (hypertension)  HLD (hyperlipidemia)  2019 novel coronavirus disease (COVID-19)  H/O: hysterectomy      Home Medications:  atorvastatin 20 mg oral tablet: 1 tab(s) orally once a day (31 Oct 2024 09:45)  losartan 25 mg oral tablet: 1 tab(s) orally once a day (31 Oct 2024 09:45)    Allergies: No Known Allergies    Social History:  social etoh drinker, quit cigarette smoking 8 years ago (28 Oct 2024 12:02)    VITAL SIGNS, INS/OUTS (last 24 hours):  Vital Signs Last 24 Hrs  T(C): 36.7 (01 Nov 2024 08:15), Max: 36.9 (01 Nov 2024 05:05)  T(F): 98.1 (01 Nov 2024 08:15), Max: 98.5 (01 Nov 2024 05:05)  HR: 67 (01 Nov 2024 08:15) (54 - 85)  BP: 136/73 (01 Nov 2024 08:15) (107/68 - 138/80)  BP(mean): 75 (31 Oct 2024 17:20) (75 - 91)  RR: 16 (01 Nov 2024 08:15) (12 - 24)  SpO2: 97% (01 Nov 2024 08:15) (94% - 99%)    Parameters below as of 01 Nov 2024 08:15  Patient On (Oxygen Delivery Method): room air      PHYSICAL EXAM:  NAD laying in bed; family member at bedside   HEENT: cervical collar in place   CTA b/l no wheezing or crackles  NL S1,S2 no mumurs   soft NT/ND + BS no rebound or guarding   AAox3; sensation intact; strength 5/5 b/l UE and LE; follows commands     BASIC LABS:                        13.4   13.28 )-----------( 192      ( 01 Nov 2024 08:29 )             41.6     139  |  104  |  10  ----------------------------<  107[H]  4.0   |  25  |  0.75    Ca    9.0      01 Nov 2024 08:29      CURRENT MEDICATIONS:   acetaminophen     Tablet .. 1000 milliGRAM(s) Oral every 8 hours  atorvastatin 20 milliGRAM(s) Oral at bedtime  HYDROmorphone  Injectable 0.5 milliGRAM(s) IV Push every 4 hours PRN  HYDROmorphone  Injectable 0.5 milliGRAM(s) IV Push every 6 hours PRN  losartan 25 milliGRAM(s) Oral daily  methocarbamol 500 milliGRAM(s) Oral every 8 hours  ondansetron   Disintegrating Tablet 4 milliGRAM(s) Oral every 6 hours  oxyCODONE    IR 5 milliGRAM(s) Oral every 4 hours PRN  oxyCODONE    IR 2.5 milliGRAM(s) Oral every 6 hours PRN  pregabalin 50 milliGRAM(s) Oral three times a day

## 2024-11-01 NOTE — PROGRESS NOTE ADULT - SUBJECTIVE AND OBJECTIVE BOX
Ortho Note    Pt comfortable without complaints, pain controlled  Denies CP, SOB, N/V, numbness/tingling     Vital Signs Last 24 Hrs  T(C): 36.9 (11-01-24 @ 05:05), Max: 36.9 (11-01-24 @ 05:05)  T(F): 98.5 (11-01-24 @ 05:05), Max: 98.5 (11-01-24 @ 05:05)  HR: 66 (11-01-24 @ 05:05) (66 - 66)  BP: 116/59 (11-01-24 @ 05:05) (116/59 - 116/59)  BP(mean): --  RR: 14 (11-01-24 @ 05:05) (14 - 14)  SpO2: 95% (11-01-24 @ 05:05) (95% - 95%)  I&O's Summary    31 Oct 2024 07:01  -  01 Nov 2024 07:00  --------------------------------------------------------  IN: 400 mL / OUT: 1000 mL / NET: -600 mL    01 Nov 2024 07:01  -  01 Nov 2024 08:43  --------------------------------------------------------  IN: 0 mL / OUT: 0 mL / NET: 0 mL      General: Pt Alert and oriented, NAD  DSG C/D/I - prineo neck and hip  Pulses: 2+  Bilat UE   Sensation: SILT C5-T1   Motor   C5 (deltoid abduction) 5/5     C6 (biceps flexion)  5/5     C7 (triceps extension) 5/5     C8 (finger flexion)  5/5     T1 (interosseous) 5/5         A/P: 59yFemale POD#1 s/p C3-4 ACDF  - Stable  - Pain Control  - DVT ppx: SCDs  - Post op abx: Ancef  - PT, WBS: WBAT  Dispo: Home today    Ortho Pager [phone]
POST OPERATIVE DAY #: 1  STATUS POST: ACDF C3-C5                  SUBJECTIVE: Patient seen and examined. Pt. states she is doing well, got up today to freshen up (walked to bathroom).   Denies any sob/cp/n/v/numbness or tingling in b/l     OBJECTIVE:     Vital Signs Last 24 Hrs  T(C): 36.7 (01 Nov 2024 08:15), Max: 36.9 (01 Nov 2024 05:05)  T(F): 98.1 (01 Nov 2024 08:15), Max: 98.5 (01 Nov 2024 05:05)  HR: 67 (01 Nov 2024 08:15) (54 - 85)  BP: 136/73 (01 Nov 2024 08:15) (107/68 - 138/80)  BP(mean): 75 (31 Oct 2024 17:20) (75 - 91)  RR: 16 (01 Nov 2024 08:15) (12 - 24)  SpO2: 97% (01 Nov 2024 08:15) (94% - 99%)    Parameters below as of 01 Nov 2024 08:15  Patient On (Oxygen Delivery Method): room air        General: NAD, in bed with collar on   Affected extremity: b/l upper extremities skin intact, no erythema/ecchymosis  Dressing: clean/dry/intact prineo in tact, collar in place   Sensation: intact to light touch to patient's baseline (less slt in right UE)  Motor exam: slt intact,  brachial/radial pulses 2+, biceps/triceps/delts, , finger int 5/5 b/l les             I&O's Detail    31 Oct 2024 07:01  -  01 Nov 2024 07:00  --------------------------------------------------------  IN:    Oral Fluid: 400 mL  Total IN: 400 mL    OUT:    Voided (mL): 1000 mL  Total OUT: 1000 mL    Total NET: -600 mL      01 Nov 2024 07:01  -  01 Nov 2024 11:34  --------------------------------------------------------  IN:  Total IN: 0 mL    OUT:    Accordian (mL): 0 mL  Total OUT: 0 mL    Total NET: 0 mL          LABS:                        13.4   13.28 )-----------( 192      ( 01 Nov 2024 08:29 )             41.6     11-01    139  |  104  |  10  ----------------------------<  107[H]  4.0   |  25  |  0.75    Ca    9.0      01 Nov 2024 08:29        Urinalysis Basic - ( 01 Nov 2024 08:29 )    Color: x / Appearance: x / SG: x / pH: x  Gluc: 107 mg/dL / Ketone: x  / Bili: x / Urobili: x   Blood: x / Protein: x / Nitrite: x   Leuk Esterase: x / RBC: x / WBC x   Sq Epi: x / Non Sq Epi: x / Bacteria: x        MEDICATIONS:    acetaminophen     Tablet .. 1000 milliGRAM(s) Oral every 8 hours  HYDROmorphone  Injectable 0.5 milliGRAM(s) IV Push every 4 hours PRN  HYDROmorphone  Injectable 0.5 milliGRAM(s) IV Push every 6 hours PRN  methocarbamol 500 milliGRAM(s) Oral every 8 hours  ondansetron   Disintegrating Tablet 4 milliGRAM(s) Oral every 6 hours  oxyCODONE    IR 2.5 milliGRAM(s) Oral every 6 hours PRN  oxyCODONE    IR 5 milliGRAM(s) Oral every 4 hours PRN  pregabalin 50 milliGRAM(s) Oral three times a day          ASSESSMENT AND PLAN: 60yo Female s/p ACDF C3-C5    1. Analgesic pain control  2. DVT prophylaxis:  SCDs        3. Weight Bearing Status:  Weight bearing as tolerated      4. Disposition: Home today, medically stable. Post op xrays performed.   5. Drain output 0cc, dc today using aseptic technique. 
Ortho Post Op Check    Procedure: C3-4 ACDF  Surgeon: Nathan    Pt comfortable without complaints, pain controlled  Denies CP, SOB, N/V, numbness/tingling     Vital Signs Last 24 Hrs  T(C): 36.6 (10-31-24 @ 17:20), Max: 36.6 (10-31-24 @ 15:50)  T(F): 97.8 (10-31-24 @ 17:20), Max: 97.9 (10-31-24 @ 15:50)  HR: 54 (10-31-24 @ 18:27) (54 - 82)  BP: 111/70 (10-31-24 @ 18:27) (107/68 - 136/64)  BP(mean): 75 (10-31-24 @ 17:20) (75 - 91)  RR: 17 (10-31-24 @ 17:20) (12 - 24)  SpO2: 95% (10-31-24 @ 18:27) (94% - 99%)  I&O's Summary    31 Oct 2024 07:01  -  31 Oct 2024 20:17  --------------------------------------------------------  IN: 400 mL / OUT: 0 mL / NET: 400 mL        General: Pt Alert and oriented, NAD  DSG C/D/I  Pulses: 2+  Sensation: SILT  Motor: 5/5 C5-T1          A/P: 59yFemale POD#0 s/p C3-4 ACDF  - Stable  - Pain Control  - DVT ppx: SCDs  - Post op abx: Ancef  - PT, WBS: WBAT  - Pending OT/PT  - Fu AM labs  - Continue HV    Ortho Pager [phone]

## 2024-11-01 NOTE — CONSULT NOTE ADULT - ASSESSMENT
59F  w/ pmhx of HTN/HLD, cervical spinal stenosis p/w neck pain now s/p  ACDF C3-C5.    #Cervical stenosis   #Neck pain s/p ACDF C3-C5  -Continue management as per Ortho  -Continue pain, DVT prop  and bowel regimen as per Ortho  -Encourage incentive spirometer use and WBS as per Ortho  -Will continue cervical collar as per Ortho  -Pt is for HMV drain removal today     #HTN/HLD   -Home medication: Losartan 25mg daily and Atoprvastatin 20mg daily   -Will continue home medications     #DVT prop  -Venodyne b/l as per primary team     #DISPO  Pt is for discharge today as per primary team     76 minutes spent on total encounter. The necessity of the time spent during the encounter on this date of service was due to:    Review of hospital course, labs, vitals, radiology and medical records.  Direct patient encounter including bedside exam and interview.   Discussed plan of care with primary team.    Documenting the encounter.     59F  w/ pmhx of HTN/HLD, cervical spinal stenosis p/w neck pain now s/p  ACDF C3-C5.    #Cervical stenosis   #Neck pain s/p ACDF C3-C5  -Continue management as per Ortho  -Continue pain, DVT prop  and bowel regimen as per Ortho  -Encourage incentive spirometer use and WBS as per Ortho  -Will continue cervical collar as per Ortho  -Pt is for HMV drain removal today   -Will f/u post op cervical Xray AP& lateral     #HTN/HLD   -Home medication: Losartan 25mg daily and Atoprvastatin 20mg daily   -Will continue home medications     #DVT prop  -Venodyne b/l as per primary team     #DISPO  Pt is likely for discharge today as per primary team pending post op Xrays and HMV drain removal     76 minutes spent on total encounter. The necessity of the time spent during the encounter on this date of service was due to:    Review of hospital course, labs, vitals, radiology and medical records.  Direct patient encounter including bedside exam and interview.   Discussed plan of care with primary team.    Documenting the encounter.     59F  w/ pmhx of HTN/HLD, cervical spinal stenosis p/w neck pain now s/p  ACDF C3-C5.    #Cervical stenosis   #Neck pain s/p ACDF C3-C5  -Continue management as per Ortho  -Continue pain, DVT prop  and bowel regimen as per Ortho  -Encourage incentive spirometer use and WBS as per Ortho  -Will continue cervical collar as per Ortho  -Pt is for HMV drain removal today   -Will f/u post op cervical Xray AP& lateral     #HTN/HLD   -Home medication: Losartan 25mg daily and Atorvastatin 20mg daily   -Will continue home medications     #DVT prop  -Venodyne b/l as per primary team     #DISPO  Pt is likely for discharge today as per primary team pending post op Xrays and HMV drain removal     76 minutes spent on total encounter. The necessity of the time spent during the encounter on this date of service was due to:    Review of hospital course, labs, vitals, radiology and medical records.  Direct patient encounter including bedside exam and interview.   Discussed plan of care with primary team.    Documenting the encounter.

## 2024-11-01 NOTE — DISCHARGE NOTE PROVIDER - CARE PROVIDER_API CALL
Richar Evans  Orthopaedic Surgery  [address] [place] [address], [place] [address]  Phone: [phone]  Fax: [phone]  Follow Up Time:

## 2024-11-09 DIAGNOSIS — M50.121 CERVICAL DISC DISORDER AT C4-C5 LEVEL WITH RADICULOPATHY: ICD-10-CM

## 2024-11-09 DIAGNOSIS — Z87.891 PERSONAL HISTORY OF NICOTINE DEPENDENCE: ICD-10-CM

## 2024-11-09 DIAGNOSIS — I10 ESSENTIAL (PRIMARY) HYPERTENSION: ICD-10-CM

## 2024-11-09 DIAGNOSIS — M50.11 CERVICAL DISC DISORDER WITH RADICULOPATHY, HIGH CERVICAL REGION: ICD-10-CM

## 2024-11-09 DIAGNOSIS — E78.5 HYPERLIPIDEMIA, UNSPECIFIED: ICD-10-CM

## 2024-11-09 DIAGNOSIS — M48.02 SPINAL STENOSIS, CERVICAL REGION: ICD-10-CM

## 2024-11-09 DIAGNOSIS — M50.021 CERVICAL DISC DISORDER AT C4-C5 LEVEL WITH MYELOPATHY: ICD-10-CM

## 2024-11-09 DIAGNOSIS — M50.01 CERVICAL DISC DISORDER WITH MYELOPATHY, HIGH CERVICAL REGION: ICD-10-CM

## 2024-11-09 DIAGNOSIS — Z90.710 ACQUIRED ABSENCE OF BOTH CERVIX AND UTERUS: ICD-10-CM

## 2024-12-27 NOTE — OCCUPATIONAL THERAPY INITIAL EVALUATION ADULT - PROPRIOCEPTION, RUE, OT EVAL
"12/27/2024    Daylin Lynch  In office visit     Chief Complaint   Patient presents with    Asthma       HPI:  12/27/2024:  Recent hospitalization on 12/7 at [facility] for UTI, RSV, Pneumonia. Hospital course reviewed as below:  Hospital Course:   "Ms. Lynch was admitted for UTI and dyspnea.  While here, her labs and vital signs were monitored and she was maintained on telemetry.  She was placed on empiric IV Rocephin and doxycycline for possible pneumonia.  Urine and blood cultures were collected.  There was concern for possible acute heart failure since she has a history of cardiomyopathy with AICD and low EF.  Her troponin and BNP were negative and her limited echocardiogram showed an EF of 55-60%.  She had a CTA of the chest which was negative for PE and showed a small right pneumonia.  Her respiratory panel grew out RSV.  Her urine culture started growing GNR.  The doxycycline was discontinued since the pneumonia was a viral etiology in the IV Rocephin was continued for her GNR UTI.  She initially did not require supplemental O2, but then she desatted to 88% on room air and supplemental O2 was initiated.  She was maintained on supportive care for the RSV with antitussive PRN and nebulizers. She was also given a dose of IV dexamethasone for her symptoms. Her final urine culture grew out a highly sensitive E coli. Her overall condition improved, and her supplemental O2 was weaned down.  She is being discharged to home today.  She will have a desat study prior and be set up for home O2 if qualifies.  She will continue a course of cefdinir, steroid taper, and follow up with her PCP and pulmonologist.  She did not have any adverse events while here."  Patient was subsequently discharged home on 12/9. Was discharged home without supplemental oxygen.  Since discharge home is feeling much better - does feel like she has low energy levels - has a scheduled follow up with cardiology next month, is anticipating " repeat echo.   States cough and mucous has resolved.   During Thanksgiving week she initially noticed respiratory complaints - started abx and steroids at that time however no improvement, presented to  and CXR was obtained, patient was then sent to the ER.   Using Trelegy once per day and Albuterol PRN - currently using twice per day - states at times she notices waking up in the middle of the night gasping for air - doesn't occur nightly but when she was sick noticed it would occur. Has never been tested for NHUNG in the past.           10/14/2024:  Using Trelegy once per day and Albuterol PRN - states Albuterol use is down currently - states not even using every other day.  States three weeks ago got illness - started Prednisone regimen and Codeine cough syrup at that time with great reported benefit. Took one pill per day for five days of Prednisone. Denies recent use of abx therapy. Feels as if sickness is related to weather changes at times. Using codeine cough syrup only as needed for intermittent cough - reports great benefit with use. Cough is associated with nocturnal arousals, difficulty staying asleep. Initially cough was productive however not any longer.  Patient Instructions   Continue Trelegy one puff once per day.  This inhaler contains an inhaled steroid component. Rinse mouth after each use due to risk for thrush development. If mouth or tongue develops white sores please contact the clinic and I will order a prescription mouth wash.   Continue Albuterol inhaler as needed for shortness of breath, wheezing, cough.   Prednisone - Keep on Hand and take only as needed, use sparingly.  Azithromycin - Keep on hand, take only as needed for yellow or green mucous production.  Codeine cough syrup prescribed - to be taken only as needed for cough. This will make you drowsy, do not drive or operative heavy machinery after use. Do not take with other sedating medications. Do not mix with alcohol. Utilize fall  precautions with use.   Continue current prescription medication regiment. Keep follow up appointment as scheduled. Please call the office if you have any questions or concerns.         04/09/2024:  Took one Nucala injection in January - states can't afford the medication and did not notice great benefit with use anyway.  Using Trelegy once per day and Albuterol PRN - [facility] Albuterol use is approx 2x per day currently.  Last month developed illness that lasted two weeks while in [place] - states she lost her voice during that time and suffered with chest tightness.   Completed prednisone 5 day regimen and Azithromycin 3 day regimen at that time with reported benefit.   Using codeine cough syrup only as needed for persistent cough - reports great benefit with use. Cough is associated with nocturnal arousals, difficulty staying asleep. Initially cough was productive however not any longer.  Patient Instructions   Continue Trelegy once per day.  Albuterol as needed.   Prednisone - Keep on Hand and take only as needed, use sparingly.  Azithromycin - Keep on hand, take only as needed.  Codeine cough syrup prescribed - to be taken only as needed for cough. This will make you drowsy, do not drive or operative heavy machinery after use. Do not take with other sedating medications. Do not mix with alcohol. Utilize fall precautions with use.   Continue current prescription medication regiment. Keep follow up appointment as scheduled. Please call the office if you have any questions or concerns.           01/08/2024:  Spent majority of the holidays in [place] visit in family.  Developed illness while there, completed 5 day regimen of prednisone at that time with reported benefit.  States since returning home she has now developed illness again.  Recently completed another 5 day regimen of prednisone and 3 day regimen azithromycin with minimal improvement.  Cough:  Persistent since onset, nonproductive.  Associated with nocturnal  arousals, difficulty falling asleep.  Associated with wheezing and chest tightness.  Patient endorses generalized low energy.  Patient denies fever, GI complaints.  Currently taking Trelegy once per day and albuterol as needed.  Currently requiring albuterol approximately up to 8 times per day.  Not currently use nebulizer treatment.  Is on Nucala monthly, however has not received Nucala injection since November due to insurance/pharmacy.  Patient Instructions   Concern for acute asthma exacerbation.  Take prednisone as prescribed.  This is the regimen that is instructed to be taken 3 pills for 3 days followed by 2 pills for 3 days followed by 1 pill for 3 days.  I have sent another prescription for prednisone with instructions to take 1 pill per day for 5 days.  Do not take this regimen at this time, this is for your on hand need.  Azithromycin ordered.  I would not take this at this time.  This is only to be taken as needed for green-yellow mucus production.  Chest x-ray now to ensure no developing pneumonia.  We will restart Nucala injection today.  Sample given in office today.    LOT [id number]  EXPIRATION MAY 2026  Codeine cough syrup prescribed - to be taken only as needed for cough. This will make you drowsy, do not drive or operative heavy machinery after use. Do not take with other sedating medications. Do not mix with alcohol. Utilize fall precautions with use.   Continue Trelegy once per day.  Albuterol as needed.  I have refilled your nebulized medicines as well.  Continue current prescription medication regiment. Keep follow up appointment as scheduled. Please call the office if you have any questions or concerns.             10/10/2023:  Approximately three weeks ago visited a nursery - ever since developed symptoms of asthma flare. Required Azithromycin and Prednisone 20 mg x 5 days. States with intervention, her symptoms have improved however she is still experiencing a lingering cough. Cough is  productive with clear mucous. Mild wheezing and chest tightness reported.   States currently using Trelegy once per day. Using Albuterol PRN - approx 4 times per day as of late.  Still on Nucala - next dose is due October 20 - states she recently got a bill for $1000 for Nucala - will investigate.   Patient Instructions   Continue current asthma regiment including Trelegy once per day and Albuterol as needed for shortness of breath, wheezing, cough.  Continue Nucala monthly. Will reach out to [facility] and see about getting assistance through the .   Will send prescription for Trelegy to [facility]. You must call and enroll in their inhaler program. Call the number on the handout I gave you in office.   Chest Xray if no improvement of symptoms.   Keep Prednisone and Azithromycin on hand for as needed symptoms - take sparingly, take as prescribed.  Continue current medication regiment. Keep follow up appointment as scheduled. Please call the office if you have any questions or concerns.       04/10/2023:  Overall doing well!   States currently using Trelegy once per day with great benefit in comparison to Symbicort.  Using Albuterol only as needed, not requiring daily.  Took a trip to [place] last month - after day 3 of trip got ill - took Azithromycin 3 day regiment and Prednisone 5 day regiment at that time with great benefit of symptoms.  Still on Nucala - states tolerating well and is noticing great benefit with use.  Patient Instructions   Continue current asthma regiment including Trelegy once per day and Albuterol as needed for shortness of breath, wheezing, cough.  Continue Nucala monthly.  Keep Prednisone and Azithromycin on hand for as needed symptoms - take sparingly, take as prescribed.  Continue current medication regiment. Keep follow up appointment as scheduled. Please call the office if you have any questions or concerns.         1/9/2023:  Recent trip to [place] - states got sick during Travel.  Completed Prednisone regiment for 5 days with benefit.  Still on Nucala - recently approved per insurance - states next dose in due 1/15.  Started weight watchers again, looking forward to losing weight.  Using Symbicort two puffs twice daily with benefit. Using Albuterol only as needed - states over the last week she has required Albuterol more - using approximately 3x per day.  Recently received letter stating disability was approved. Looking to get on Medicare soon.  Patient Instructions   Continue current asthma regiment including Symbicort twice per day and Albuterol as needed.  Nebulizer machine ordered with Albuterol and Budesonide - to be taken only as needed.  Keep Prednisone on hand to be taken only as needed, use sparingly.  Will give sample Trelegy today - this is one puff once per day. This inhaler contains an inhaled steroid component. Rinse mouth after each use due to risk for thrush development. If mouth or tongue develops white sores please contact the clinic and I will order a prescription mouth wash.   Stop Symbicort while using Trelegy.  Continue current medication regiment. Keep follow up appointment as scheduled. Please call the office if you have any questions or concerns.           11/8/2022:  Recent trip to [place] to visit family - [facility] while on trip had an asthma flare. Started taking Prednisone yesterday, on day 2.   Reports use of Prednisone two times over the last six months alone - had to take regiment upon discharge from hospital in June 2022. Reports great benefit with steroid use.   Using Symbicort two puffs twice daily with benefit. Using Albuterol only as needed - states over the last week she has required Albuterol more - using approximately 3x per day.  On Singulair nightly with benefit.   Reports chest tightness.   Shortness of breath: Persistent over the last week or so - associated with wheezing, worse at night time. Associated with nocturnal arousals nightly. States can't  perform ADLS without stopping for rest.  Cough: Persistent, worse at night time, non productive. Relieved somewhat with cough syrup.  Patient Instructions   Asthma - severe persistent, uncontrolled with 2 steroid courses in the past six months. Eosinophil count 500 in June 2022.   Can check CBC now, then initiate Nucala in clinic.  Nucala sample given in clinic  LOT number [id number]  Exp May 2025  Continue Symbicort twice per day. Albuterol as needed for shortness of breath, wheezing, cough.  Continue current medication regiment. Keep follow up appointment as scheduled. Please call the office if you have any questions or concerns.         8/8/2022:  Recent trip to [place] - had several issues with shortness of breath, coughing while on trip. Overall doing better since returning home.   Cough: Overall improved - nonproductive, no time correlation identified. Denies wheezing.  Shortness of breath: Persistent with minimal exertion - improves with rest.   Currently using Symbicort - two puffs twice per day with reported benefit. Using Albuterol only as needed, not daily use - states while on trip in [place] required several times per day, however hasn't needed much since returning home.  Hasn't taken Prednisone since prior visit.   Patient Instructions   Overall you seem to be doing better.  Would continue Symbicort two puffs twice daily with Albuterol use as needed for shortness of breath, wheezing.  Keep Prednisone on hand to take as needed for shortness of breath, cough, wheezing.  Can consider biologic in the future if symptoms worsen.  Consider PFT if the future if no improvement.  Continue current medication regiment. Keep follow up appointment as scheduled. Please call the office if you have any questions or concerns.         6/24/22:  Hx: Annual bronchitis in the winter, pleurisy, CHF, AICD placement,   Recent ER visit on 6/12/22 to [facility] for CP, SOB - patient was noted to have elevated troponin at that  time, decision made for transfer to [facility] for possible cath. Patient underwent series of testing including echocardiogram revealing EF 40% in the setting of known systolic heart failure. Patient did not undergo angiogram during this hospitalization. CTA obtained revealing no PE, however concern regarding atypical pneumonia due to bilateral ground glass opacities. Patient was treated with IV Rocephin, Azithromycin, and steroid - reported great improvement at that time with medication therapy. Patient was subsequently discharged home, without supplemental oxygen, with a prescription for Levaquin (reports completion), five days worth of steroid PO, and PRN albuterol inhaler.   Was seen per PCP on Tuesday 6/21 and prescribed Symbicort inhaler, Flonase, and Promethazine cough syrup.   Cough: Persistent over the last 6 months - states it's overall improved since hospitalization however not completely resolved. States cough was attributed in the past to congestive heart failure. Was diagnosed with bronchitis per primary care provider in March - given codeine cough syrup at that time, no antibiotic or steroids. Cough persists throughout the day however is noticeably worse at night time, when lying down. Non productive. Associated with coughing fits, dizziness. Associated with chest tightness, wheezing. No relief noted with tessalon pearls, however has tried codeine and promethazine cough syrup separately - states codeine worked better.  Shortness of breath: Gradual onset over the last six months - feels chest heaviness at rest however experiences exertional dyspnea - states affecting ADLS. Can't take a shower and get dressed without stopping for rest. Improves with rest.   Does endorse sinus congestion, ear congestion, post nasal drip - started Zyrtec daily in April, does not appreciate benefit.   Has started Symbicort - taking two puffs in the morning - has been on for three days now, reports benefit with use.  Has tried  Albuterol in the past without noticeable benefit.   Takes Protonix daily.  Per review of EMR discharge summary:  Hospital Course:   59-year-old lady with numerous medical problems including chronic systolic CHF who has been suffering with dry cough for last couple months presented to ED with cough and cough related pain found to have mildly elevated troponin and was subsequently transferred to [facility].  Serial EKGs, cardiac enzymes remained negative for acute coronary syndrome.  Stress test ruled out any reversible ischemia.  Her ejection fraction is overall stable from previous echo.  Overall symptomatology is more consistent with pulmonary in origin, CTA chest showed atypical pneumonia.  She responded well after starting Rocephin and azithromycin as well as 1 dose of IV steroid.  Patient has chronic postnasal drip.  She was discharged home in hemodynamically stable condition with prescription of Levaquin, p.r.n. albuterol.  She was advised to follow-up with her PCP, I advised her that if her symptoms do not improve she may discuss with her cardiologist if Entresto is the culprit of chronic dry cough.  I also generated outpatient pulmonary referral.   Patient Instructions   Repeat chest xray in 1-2 weeks to evaluate post atypical pneumonia.  Symptoms are somewhat concerning for asthmatic component. Would continue Symbicort however start using 2 puffs twice per day.   Can use Albuterol as needed for shortness of breath, cough.  For sinus complaints - can try Singulair at night time with Zyrtec during the day.   Can send to ENT if interested.  Etiology of cough is unknown at this time - however could be multifactorial.  Entresto?  GERD - Currently on Protonix  Codeine cough syrup to be taken as needed for cough, caution as may make drowsy. Do not drive after taking.  Prednisone, take only as needed.  Continue current medication regiment. Keep follow up appointment as scheduled. Please call the office if you have any  questions or concerns.         Social Hx: Lives with  - 2 animals in the home. Former [employer] employee, . No Asbestosis exposure, Smoking Hx: Former smoker, on and off for approx ten years - quit in   Family Hx: No Lung Cancer, Mother COPD, No Asthma  Medical Hx: Previous pneumonia ; No previous shoulder surgery - Defibrillator placement         The chief compliant problem varies with instability at times.  PFSH:  Past Medical History:   Diagnosis Date    Allergy     Asthma     Bilateral leg edema     Hyperlipidemia     Hypertension     Leg pain, bilateral     left leg more severe    Varicose vein          Past Surgical History:   Procedure Laterality Date     SECTION, LOW TRANSVERSE      x 3    EYE SURGERY  2023    lt retinal surgery    HYSTERECTOMY      pace maker  2020    SPINE SURGERY  2021    stents both legs       Social History     Tobacco Use    Smoking status: Former     Current packs/day: 0.00     Types: Cigarettes     Quit date: 1985     Years since quittin.0     Passive exposure: Past    Smokeless tobacco: Never   Substance Use Topics    Alcohol use: Yes     Comment: occ    Drug use: No     Family History   Problem Relation Name Age of Onset    Heart disease Mother      Heart failure Father      Migraines Sister 1/2     Cancer Sister 1/2         lung then pancreatic     Review of patient's allergies indicates:  No Known Allergies  I have reviewed past medical, family, and social history. I have reviewed previous nurse notes.    Performance Status:The patient's activity level is functions out of house.      Review of Systems:  a review of eleven systems covering constitutional, Eye, HEENT, Psych, Respiratory, Cardiac, GI, , Musculoskeletal, Endocrine, Dermatologic was negative except for pertinent findings as listed ABOVE and below: pertinent positive as above, rest is good       Exam:Comprehensive exam done. /72 (BP Location: Left arm,  "Patient Position: Sitting)   Pulse 101   Ht 5' 7" (1.702 m)   Wt 92.7 kg (204 lb 5.9 oz)   SpO2 95%   BMI 32.01 kg/m²   Exam included Vitals as listed  Constitutional: She is oriented to person, place, and time. She appears well-developed. No distress.   Nose: Nose normal.   Mouth/Throat: Uvula is midline, oropharynx is clear and moist and mucous membranes are normal. No dental caries. No oropharyngeal exudate, posterior oropharyngeal edema, posterior oropharyngeal erythema or tonsillar abscesses.  Mallapatti (M) score 3  Eyes: Pupils are equal, round, and reactive to light.   Neck: No JVD present. No thyromegaly present.   Cardiovascular: Normal rate, regular rhythm and normal heart sounds. Exam reveals no gallop and no friction rub.   No murmur heard.  Pulmonary/Chest: Effort normal and breath sounds normal. No accessory muscle usage or stridor. No apnea and no tachypnea. No respiratory distress, decreased breath sounds, wheezes, rhonchi, rales, or tenderness. Clear breath sounds throughout, on room air, in no acute distress.  Abdominal: Soft. She exhibits no mass. There is no tenderness. No hepatosplenomegaly, hernias and normoactive bowel sounds  Musculoskeletal: Normal range of motion. exhibits no edema.   Neurological:  alert and oriented to person, place, and time. not disoriented.   Skin: Skin is warm and dry. Capillary refill takes less 2 sec. No cyanosis or erythema. No pallor. Nails show no clubbing.   Psychiatric: normal mood and affect. behavior is normal. Judgment and thought content normal.         Radiographs (ct chest and cxr) reviewed: view by direct vision     CTA Chest Non-Coronary (PE Studies)12/7/2024 Impression: No evidence of PE - RLL infiltration - adenopathy.      Chest Xray 7/20/2022  FINDINGS:  An AICD is noted in place on the left.  The cardiac size and contours within normal limits.  No intrapulmonary mass or infiltrate is seen.  No pneumothorax or pleural effusion is noted. "   Impression:   AICD noted in place otherwise negative chest    CTA Chest Non Coronary  6/13/2022   IMPRESSION:   1.  No pulmonary embolism identified.  2.  Patchy groundglass lung opacities are noted bilaterally, mostly involving the lung bases. These most likely reflect inspiratory effort or subsegmental atelectasis. Atypical infection could have a similar appearance in the proper clinical setting      X-Ray Chest PA And Lateral   6/13/2022   Impression:   AICD in place otherwise negative chest      Patient's labs were reviewed including CBC and CMP    Lab Results   Component Value Date    WBC 13.42 (H) 12/09/2024    RBC 3.72 (L) 12/09/2024    HGB 11.0 (L) 12/09/2024    HCT 34.4 (L) 12/09/2024    MCV 93 12/09/2024    MCH 29.6 12/09/2024    MCHC 32.0 12/09/2024    RDW 14.3 12/09/2024     12/09/2024    MPV 9.5 12/09/2024    GRAN 9.8 (H) 12/09/2024    GRAN 72.8 12/09/2024    LYMPH 2.8 12/09/2024    LYMPH 20.8 12/09/2024    MONO 0.7 12/09/2024    MONO 5.4 12/09/2024    EOS 0.0 12/09/2024    BASO 0.02 12/09/2024    EOSINOPHIL 0.0 12/09/2024    BASOPHIL 0.1 12/09/2024   CMP  Sodium   Date Value Ref Range Status   12/09/2024 141 136 - 145 mmol/L Final   01/23/2017 139 134 - 144 mmol/L      Potassium   Date Value Ref Range Status   12/09/2024 4.0 3.5 - 5.1 mmol/L Final     Chloride   Date Value Ref Range Status   12/09/2024 105 95 - 110 mmol/L Final   01/23/2017 101 98 - 110 mmol/L      CO2   Date Value Ref Range Status   12/09/2024 29 23 - 29 mmol/L Final     Glucose   Date Value Ref Range Status   12/09/2024 117 (H) 70 - 110 mg/dL Final   01/23/2017 85 70 - 99 mg/dL      BUN   Date Value Ref Range Status   12/09/2024 22 8 - 23 mg/dL Final     Creatinine   Date Value Ref Range Status   12/09/2024 0.6 0.5 - 1.4 mg/dL Final   01/23/2017 0.58 (L) 0.60 - 1.40 mg/dL      Calcium   Date Value Ref Range Status   12/09/2024 8.7 8.7 - 10.5 mg/dL Final     Total Protein   Date Value Ref Range Status   12/09/2024 6.5 6.0 -  8.4 g/dL Final     Albumin   Date Value Ref Range Status   12/09/2024 3.3 (L) 3.5 - 5.2 g/dL Final   01/23/2017 4.1 3.1 - 4.7 g/dL      Total Bilirubin   Date Value Ref Range Status   12/09/2024 0.4 0.1 - 1.0 mg/dL Final     Comment:     For infants and newborns, interpretation of results should be based  on gestational age, weight and in agreement with clinical  observations.    Premature Infant recommended reference ranges:  Up to 24 hours.............<8.0 mg/dL  Up to 48 hours............<12.0 mg/dL  3-5 days..................<15.0 mg/dL  6-29 days.................<15.0 mg/dL       Alkaline Phosphatase   Date Value Ref Range Status   12/09/2024 46 (L) 55 - 135 U/L Final     AST   Date Value Ref Range Status   12/09/2024 24 10 - 40 U/L Final     ALT   Date Value Ref Range Status   12/09/2024 19 10 - 44 U/L Final     Anion Gap   Date Value Ref Range Status   12/09/2024 7 (L) 8 - 16 mmol/L Final     eGFR   Date Value Ref Range Status   12/09/2024 >60.0 >60 mL/min/1.73 m^2 Final         PFT will be done and results to be reviewed  Pulmonary Functions Testing Results:      Transthoracic echo (TTE) complete 6/13/2022:   Summary  The left ventricle is normal in size with concentric hypertrophy and mildly decreased systolic function.  The estimated ejection fraction is 40%.  Grade I left ventricular diastolic dysfunction.  There is mild left ventricular global hypokinesis.  Normal right ventricular size with normal right ventricular systolic function.  Moderate left atrial enlargement.  Mild tricuspid regurgitation.  Normal central venous pressure (3 mmHg).  The estimated PA systolic pressure is 10 mmHg.        Plan:  Clinical impression is resonably certain and repeated evaluation prn +/- follow up will be needed as below.    [place] was seen today for asthma.    Diagnoses and all orders for this visit:    Severe persistent asthma without complication    Chronic seasonal allergic rhinitis due to pollen    Eosinophilic  asthma    Chronic combined systolic and diastolic heart failure    RSV (respiratory syncytial virus pneumonia)  -     X-Ray Chest PA And Lateral; Standing  -     CT Chest Without Contrast; Future          Follow up in about 3 months (around 3/27/2025), or if symptoms worsen or fail to improve.    Discussed with patient above for education the following:      Patient Instructions   Continue Trelegy one puff once per day.  This inhaler contains an inhaled steroid component. Rinse mouth after each use due to risk for thrush development. If mouth or tongue develops white sores please contact the clinic and I will order a prescription mouth wash.     Continue Albuterol inhaler as needed for shortness of breath, wheezing, cough.     Prednisone - Keep on Hand and take only as needed, use sparingly.    Azithromycin - Keep on hand, take only as needed for yellow or green mucous production.    Continue current prescription medication regiment. Keep follow up appointment as scheduled. Please call the office if you have any questions or concerns.                within normal limits

## (undated) DEVICE — BUR STRYKER MULTI FLUTE ROUND 6MM

## (undated) DEVICE — SPONGE ENDO PEANUT 5MM

## (undated) DEVICE — BUR STRYKER MULTI FLUTE ROUND 5MM

## (undated) DEVICE — WARMING BLANKET LOWER ADULT

## (undated) DEVICE — DRAIN JACKSON PRATT 3 SPRING RESERVOIR W 10FR PVC DRAIN

## (undated) DEVICE — SUT VICRYL 1 36" CTB-1 UNDYED

## (undated) DEVICE — PACK SPINE

## (undated) DEVICE — DRSG DERMABOND PRINEO 60CM

## (undated) DEVICE — GLV 8 PROTEXIS (WHITE)

## (undated) DEVICE — DRAPE 1/2 SHEET 40X57"

## (undated) DEVICE — BUR STRYKER MULTI FLUTE ROUND 2MM

## (undated) DEVICE — CLIPPER BLADE GENERAL USE

## (undated) DEVICE — GLV 8 DERMAPRENE ULTRA

## (undated) DEVICE — SUT MONOCRYL 3-0 18" PS-1

## (undated) DEVICE — Device

## (undated) DEVICE — POSITIONER FOAM EGG CRATE ULNAR 2PCS (PINK)

## (undated) DEVICE — DRSG DERMABOND PRINEO 22CM

## (undated) DEVICE — DRAPE 3/4 SHEET 52X76"

## (undated) DEVICE — DRAPE GENERAL ENDOSCOPY

## (undated) DEVICE — BUR STRYKER MULTI FLUTE ROUND 4MM

## (undated) DEVICE — VENODYNE/SCD SLEEVE CALF MEDIUM

## (undated) DEVICE — SUT VICRYL 2-0 27" SH UNDYED

## (undated) DEVICE — BUR STRYKER MULTI FLUTE ROUND 3MM

## (undated) DEVICE — PREP CHLORAPREP HI-LITE ORANGE 26ML

## (undated) DEVICE — DRAPE IOBAN 23" X 17"

## (undated) DEVICE — ELCTR STRYKER NEPTUNE SMOKE EVACUATION PENCIL (GREEN)

## (undated) DEVICE — SUT VICRYL 2-0 27" CT-1 UNDYED

## (undated) DEVICE — GLV 7.5 PROTEXIS (WHITE)

## (undated) DEVICE — SUT VICRYL 3-0 18" PS-1 UNDYED

## (undated) DEVICE — DRILL BIT STRYKER PRECISION NEURO 3X3.8MM

## (undated) DEVICE — DRAPE INSTRUMENT POUCH 6.75" X 11"

## (undated) DEVICE — DRAPE BACK TABLE COVER 80X90"